# Patient Record
(demographics unavailable — no encounter records)

---

## 2024-10-22 NOTE — HISTORY OF PRESENT ILLNESS
[de-identified] : EAR F/U ASYMPTOMATIC THROAT TO BE CHECKED FOR INFECTION IF ANY NO FURTHER NOSE BLEED MEDICAL HX REVIEWED

## 2024-10-22 NOTE — PHYSICAL EXAM
[Exposed Vessel] : left anterior vessel exposed [2+] : 2+ [Normal] : normal [Normal muscle strength, symmetry and tone of facial, head and neck musculature] : normal muscle strength, symmetry and tone of facial, head and neck musculature [Wheezing] : no wheezing [Increased Work of Breathing] : no increased work of breathing with use of accessory muscles and retractions

## 2025-05-15 NOTE — ASSESSMENT
[FreeTextEntry1] : Reviewed and reconciled medications, allergies, PMHx, PSHx, SocHx, FMHx   Pt. with history of epistaxis and snoring presents today with his mom who states his nose has been bleeding a lot again. States it started 4 days ago but last night was the worse. States he has been having nose bleeds for the last years. States the bleeding is from both sides. Mom states he tends to pick at his nose. Mom states the snoring has stopped.   Physical Exam: -left ear: might have some fluid in it -tonsils class 2 -dried blood right side of nose -dried blood left side of nose  ENT Procedure: bilateral nasal cautery with Silver Nitrate  Plan: Start using nasal gel spray 3-4 times a day. Sneeze with mouth open. Avoid nose blowing. FU 3 weeks

## 2025-05-15 NOTE — ADDENDUM
[FreeTextEntry1] : Documented by Radha Mcclure acting as scribe for Dr. Gonzales on 05/15/2025 All Medical record entries made by the Scribe were at my, Dr. Gonzales, direction and personally dictated by me on 05/15/2025  . I have reviewed the chart and agree that the record accurately reflects my personal performance of the history, physical exam, assessment and plan. I have also personally directed, reviewed, and agreed with the discharge instructions.

## 2025-05-15 NOTE — PROCEDURE
[FreeTextEntry1] : Bilateral Nasal Cauterization with Silver Nitrate [FreeTextEntry2] : bilateral epistaxis  [FreeTextEntry3] : Procedure: Bilateral Nasal Cauterization with Silver Nitrate. After topical 4% xylocaine and oxymetazoline, the nasal vault was re-evaluated. Bleeding site identified. Cauterized with silver nitrate. Post-procedure instructions given. Patient tolerated procedure well

## 2025-05-15 NOTE — CONSULT LETTER
[Dear  ___] : Dear  [unfilled], [Consult Letter:] : I had the pleasure of evaluating your patient, [unfilled]. [Please see my note below.] : Please see my note below. [Consult Closing:] : Thank you very much for allowing me to participate in the care of this patient.  If you have any questions, please do not hesitate to contact me. [Sincerely,] : Sincerely, [FreeTextEntry3] : Edenilson Gonzales MD FACS

## 2025-05-15 NOTE — PHYSICAL EXAM
[Normal] : normal [2+] : 2+ [FreeTextEntry9] : might have some fluid in it [de-identified] : scratch and dried blood right side of nose [de-identified] : dried blood